# Patient Record
Sex: FEMALE | Race: WHITE | Employment: PART TIME | URBAN - METROPOLITAN AREA
[De-identification: names, ages, dates, MRNs, and addresses within clinical notes are randomized per-mention and may not be internally consistent; named-entity substitution may affect disease eponyms.]

---

## 2017-05-29 ENCOUNTER — HOSPITAL ENCOUNTER (EMERGENCY)
Age: 42
Discharge: HOME | End: 2017-05-29
Attending: EMERGENCY MEDICINE
Payer: SUBSIDIZED

## 2017-05-29 VITALS
SYSTOLIC BLOOD PRESSURE: 107 MMHG | TEMPERATURE: 97.6 F | BODY MASS INDEX: 27.14 KG/M2 | HEART RATE: 52 BPM | OXYGEN SATURATION: 100 % | WEIGHT: 159 LBS | DIASTOLIC BLOOD PRESSURE: 63 MMHG | RESPIRATION RATE: 18 BRPM | HEIGHT: 64 IN

## 2017-05-29 DIAGNOSIS — R09.81 SINUS CONGESTION: Primary | ICD-10-CM

## 2017-05-29 PROCEDURE — 99284 EMERGENCY DEPT VISIT MOD MDM: CPT | Performed by: PHYSICIAN ASSISTANT

## 2017-05-29 PROCEDURE — 74011250637 HC RX REV CODE- 250/637: Performed by: PHYSICIAN ASSISTANT

## 2017-05-29 RX ORDER — IBUPROFEN 800 MG/1
800 TABLET ORAL
Qty: 30 TAB | Refills: 0 | Status: SHIPPED | OUTPATIENT
Start: 2017-05-29 | End: 2017-06-08

## 2017-05-29 RX ORDER — DEXAMETHASONE SODIUM PHOSPHATE 100 MG/10ML
10 INJECTION INTRAMUSCULAR; INTRAVENOUS
Status: COMPLETED | OUTPATIENT
Start: 2017-05-29 | End: 2017-05-29

## 2017-05-29 RX ORDER — ACETAMINOPHEN 500 MG
1000 TABLET ORAL
Status: COMPLETED | OUTPATIENT
Start: 2017-05-29 | End: 2017-05-29

## 2017-05-29 RX ORDER — PSEUDOEPHEDRINE HCL 120 MG/1
120 TABLET, FILM COATED, EXTENDED RELEASE ORAL EVERY 12 HOURS
Qty: 20 TAB | Refills: 0 | Status: SHIPPED | OUTPATIENT
Start: 2017-05-29 | End: 2017-06-08

## 2017-05-29 RX ORDER — CETIRIZINE HCL 10 MG
10 TABLET ORAL DAILY
Qty: 20 TAB | Refills: 0 | Status: SHIPPED | OUTPATIENT
Start: 2017-05-29 | End: 2017-06-08

## 2017-05-29 RX ADMIN — DEXAMETHASONE SODIUM PHOSPHATE 10 MG: 10 INJECTION INTRAMUSCULAR; INTRAVENOUS at 09:31

## 2017-05-29 RX ADMIN — ACETAMINOPHEN 1000 MG: 500 TABLET ORAL at 09:31

## 2017-05-29 NOTE — LETTER
NOTIFICATION RETURN TO WORK / SCHOOL 
 
5/29/2017 9:44 AM 
 
Ms. Adriana Walker [address] [address] To Whom It May Concern: 
 
Adriana Walker is currently under the care of [facility] EMERGENCY DEPT. She will return to work/school on: 6-1-17 If there are questions or concerns please have the patient contact our office. Sincerely, Nick Vera RN

## 2017-05-29 NOTE — ED TRIAGE NOTES
Pt arrive c/o facial swelling to left side and sinus drainage. Pt states \"I see something white in my nose on the bone that has been there for 1-2 months. \"

## 2017-05-29 NOTE — ED PROVIDER NOTES
HPI Comments: Patient presents to the ER complaining of left-sided \"sinus congestion /drainage and pain\". Dull, aching pain radiates behind left eye, also endorses postnasal drip. Patient states symptoms started 2 days ago, congestion is worse at night. Patient states she noticed a \"white spots\" on the left nare. Endorsed one epistaxis yesterday. Resolved after less than 10 minutes. Denies dental pain, ear pain, fever, nausea, vomiting, neck pain, chest pain, cough her palpitations are decreased appetite. No sick contacts. Patient is a smoker. Currently taking ciprofloxacin for UTI. Denies cocaine use. Hysterectomy last month. Patient is a 42 y.o. female presenting with sinus problems. Sinus Infection    Associated symptoms include congestion, sinus pressure and rhinorrhea. Pertinent negatives include no chills, no ear pain, no cough, no neck pain and no neck pain. Past Medical History:   Diagnosis Date    Dental disorder     Fibromyalgia     Long-term current use of methadone for opiate dependence ([facility] [address]     Nausea & vomiting 10/31/2011    Psychiatric disorder     depression, ADHD and anxiety    Pyelonephritis, acute 10/31/2011    Pyelonephritis, acute 10/31/2011       Past Surgical History:   Procedure Laterality Date    HX CHOLECYSTECTOMY      HX GYN      uterine suspension    HX HEENT      tonsillectomy; TMJ    HX HEENT  2016    All top teeth removed due to gum disease    HX ORTHOPAEDIC      heel spurs ronen heels         Family History:   Problem Relation Age of Onset    Stroke Father      CEREBRAL HEMMORHAGE    Alcohol abuse Father     Arthritis-osteo Mother     Arthritis-rheumatoid Mother     Heart Disease Maternal Grandfather        Social History     Social History    Marital status: SINGLE     Spouse name: N/A    Number of children: N/A    Years of education: N/A     Occupational History    Not on file.      Social History Main Topics    Smoking status: Current Every Day Smoker     Packs/day: 0.50     Years: 20.00    Smokeless tobacco: Never Used    Alcohol use No    Drug use: No      Comment: previous opiate addict- clean x 5 years, methadone    Sexual activity: Not Currently     Other Topics Concern    Not on file     Social History Narrative         ALLERGIES: Penicillins    Review of Systems   Constitutional: Negative for chills, fatigue and fever. HENT: Positive for congestion, facial swelling, nosebleeds, postnasal drip, rhinorrhea and sinus pressure. Negative for dental problem, ear discharge, ear pain, tinnitus, trouble swallowing and voice change. Respiratory: Negative for cough. Gastrointestinal: Negative for nausea. Musculoskeletal: Negative for neck pain and neck stiffness. Skin: Negative for rash. Vitals:    05/29/17 0814   BP: 152/77   Pulse: 74   Resp: 18   Temp: 97.6 °F (36.4 °C)   SpO2: 99%   Weight: 72.1 kg (159 lb)   Height: 5' 4\" (1.626 m)            Physical Exam   Constitutional: She appears well-developed and well-nourished. Healthy, nontoxic appearing well-nourished white female in no acute distress. HENT:   Head: Normocephalic and atraumatic. Right Ear: Tympanic membrane and external ear normal.   Left Ear: Tympanic membrane and external ear normal.   Nose: Septal deviation present. No mucosal edema, rhinorrhea, nose lacerations, sinus tenderness or nasal septal hematoma. No epistaxis. Mouth/Throat: Oropharynx is clear and moist.   TMs bilaterally clear, posterior oropharynx with some injection, no edema, erythema or exudate. Uvula midline. Patient has false teeth upper jaw, partial lower jaw. No trismus. No cervical lymphadenopathy. No neck tenderness. ROM neck normal.   Neck: Normal range of motion and full passive range of motion without pain. Neck supple. No spinous process tenderness and no muscular tenderness present. No rigidity. No edema present.    No meningeal signs   Pulmonary/Chest: Effort normal and breath sounds normal. No accessory muscle usage. No respiratory distress. She has no decreased breath sounds. She has no wheezes. She has no rhonchi. Nursing note and vitals reviewed. MDM  Number of Diagnoses or Management Options  Sinus congestion: new and requires workup  Risk of Complications, Morbidity, and/or Mortality  Presenting problems: moderate  Diagnostic procedures: moderate  Management options: moderate    Patient Progress  Patient progress: improved    ED Course       Procedures      The patient was observed in the ED. Results Reviewed:      No results found for this or any previous visit (from the past 24 hour(s)). Patient's major complaint is pain on left side of faced with nasal drainage and postnasal drip. We'll give Decadron by mouth in the ER, Rx for pseudoephedrine, Zyrtec and Motrin given. Patient agrees to follow up with PCP, community resource information given. Patient declines work note. I discussed the results of all labs, procedures, radiographs, and treatments with the patient and available family. Treatment plan is agreed upon and the patient is ready for discharge. All voiced understanding of the discharge plan and medication instructions or changes as appropriate. Questions about treatment in the ED were answered. All were encouraged to return should symptoms worsen or new problems develop.                      `

## 2017-06-02 ENCOUNTER — HOSPITAL ENCOUNTER (EMERGENCY)
Age: 42
Discharge: HOME | End: 2017-06-02
Attending: EMERGENCY MEDICINE
Payer: SUBSIDIZED

## 2017-06-02 VITALS
DIASTOLIC BLOOD PRESSURE: 49 MMHG | BODY MASS INDEX: 27.14 KG/M2 | HEIGHT: 64 IN | RESPIRATION RATE: 16 BRPM | WEIGHT: 159 LBS | SYSTOLIC BLOOD PRESSURE: 109 MMHG | OXYGEN SATURATION: 98 % | HEART RATE: 96 BPM | TEMPERATURE: 97.7 F

## 2017-06-02 DIAGNOSIS — G56.02 CARPAL TUNNEL SYNDROME OF LEFT WRIST: Primary | ICD-10-CM

## 2017-06-02 LAB
AMPHET UR QL SCN: POSITIVE
BARBITURATES UR QL SCN: NEGATIVE
BENZODIAZ UR QL: POSITIVE
CANNABINOIDS UR QL SCN: POSITIVE
COCAINE UR QL SCN: NEGATIVE
HCG UR QL: NEGATIVE
METHADONE UR QL: NEGATIVE
OPIATES UR QL: POSITIVE
PCP UR QL: NEGATIVE

## 2017-06-02 PROCEDURE — 80307 DRUG TEST PRSMV CHEM ANLYZR: CPT | Performed by: EMERGENCY MEDICINE

## 2017-06-02 PROCEDURE — 81025 URINE PREGNANCY TEST: CPT

## 2017-06-02 PROCEDURE — 74011250637 HC RX REV CODE- 250/637: Performed by: NURSE PRACTITIONER

## 2017-06-02 PROCEDURE — 81003 URINALYSIS AUTO W/O SCOPE: CPT | Performed by: NURSE PRACTITIONER

## 2017-06-02 PROCEDURE — 75810000053 HC SPLINT APPLICATION: Performed by: NURSE PRACTITIONER

## 2017-06-02 PROCEDURE — 99284 EMERGENCY DEPT VISIT MOD MDM: CPT | Performed by: NURSE PRACTITIONER

## 2017-06-02 PROCEDURE — L3908 WHO COCK-UP NONMOLDE PRE OTS: HCPCS

## 2017-06-02 RX ORDER — PREDNISONE 20 MG/1
20 TABLET ORAL DAILY
Qty: 7 TAB | Refills: 0 | Status: SHIPPED | OUTPATIENT
Start: 2017-06-02 | End: 2017-06-09

## 2017-06-02 RX ORDER — PREDNISONE 50 MG/1
50 TABLET ORAL DAILY
Qty: 3 TAB | Refills: 0 | Status: SHIPPED | OUTPATIENT
Start: 2017-06-02 | End: 2017-06-05

## 2017-06-02 RX ORDER — HYDROCODONE BITARTRATE AND ACETAMINOPHEN 5; 325 MG/1; MG/1
1 TABLET ORAL ONCE
Status: COMPLETED | OUTPATIENT
Start: 2017-06-02 | End: 2017-06-02

## 2017-06-02 RX ADMIN — HYDROCODONE BITARTRATE AND ACETAMINOPHEN 1 TABLET: 5; 325 TABLET ORAL at 14:18

## 2017-06-02 NOTE — LETTER
[facility] [facility] EMERGENCY DEPT [address] [id number] [phone] Work/School Note Date: 6/2/2017 To Whom It May concern: 
 
Andre Veronica was seen and treated today in the emergency room by the following provider(s): 
Attending Provider: Heather Candelario MD 
Nurse Practitioner: Sabina Hughes NP. Andre Morenoing Special Instructions: May resume activity with splint on left wrist for one month. Sincerely, Sabina Hughes NP

## 2017-06-02 NOTE — ED TRIAGE NOTES
Pt doing yard work today and left arm started to hurt when shoveling, started about an hours and a half ago. Pt questioned triage as to why we needed a urine specimen, explained and stated she had a hysterectomy and didn't need to give a specimen, pt was told she still needed to give a urine specimen.

## 2017-06-02 NOTE — ED NOTES
Velcro splint applied to L wrist per NP instructions. Fingers warm with cap refill <3 sec. Radial pulse positive.

## 2017-06-02 NOTE — DISCHARGE INSTRUCTIONS
Carpal Tunnel Syndrome: Care Instructions  Your Care Instructions    Carpal tunnel syndrome is a nerve problem. It can cause tingling, numbness, weakness, or pain in the fingers, thumb, and hand. The median nerve and several tough tissues called tendons run through a space in the wrist called the carpal tunnel. The repeated hand motions used in work and some hobbies and sports can put pressure on the nerve. Pregnancy and several conditions, including diabetes, arthritis, and an underactive thyroid, also can cause carpal tunnel syndrome. You may be able to limit an activity or do it differently to reduce your symptoms. You also can take other steps to feel better. If your symptoms are mild, 1 to 2 weeks of home treatment are likely to ease your pain. Surgery is needed only if other treatments do not work. Follow-up care is a key part of your treatment and safety. Be sure to make and go to all appointments, and call your doctor if you are having problems. It's also a good idea to know your test results and keep a list of the medicines you take. How can you care for yourself at home? · If possible, stop or reduce the activity that causes your symptoms. If you cannot stop the activity, take frequent breaks to rest and stretch or change hand positions to do a task. Try switching hands, such as when using a computer mouse. · Try to avoid bending or twisting your wrists. · Ask your doctor if you can take an over-the-counter pain medicine, such as acetaminophen (Tylenol), ibuprofen (Advil, Motrin), or naproxen (Aleve). Be safe with medicines. Read and follow all instructions on the label. · If your doctor prescribes corticosteroid medicine to help reduce pain and swelling, take it exactly as prescribed. Call your doctor if you think you are having a problem with your medicine. · Put ice or a cold pack on your wrist for 10 to 20 minutes at a time to ease pain.  Put a thin cloth between the ice and your skin.  · If your doctor or your physical or occupational therapist tells you to wear a wrist splint, wear it as directed to keep your wrist in a neutral position. This also eases pressure on your median nerve. · Ask your doctor whether you should have physical or occupational therapy to learn how to do tasks differently. · Try a yoga class to stretch your muscles and build strength in your hands and wrists. Yoga has been shown to ease carpal tunnel symptoms. To prevent carpal tunnel  · When working at a computer, keep your hands and wrists in line with your forearms. Hold your elbows close to your sides. Take a break every 10 to 15 minutes. · Try these exercises:  ¨ Warm up: Rotate your wrist up, down, and from side to side. Repeat this 4 times. Stretch your fingers far apart, relax them, then stretch them again. Repeat 4 times. Stretch your thumb by pulling it back gently, holding it, and then releasing it. Repeat 4 times. ¨ Prayer stretch: Start with your palms together in front of your chest just below your chin. Slowly lower your hands toward your waistline while keeping your hands close to your stomach and your palms together until you feel a mild to moderate stretch under your forearms. Hold for 10 to 20 seconds. Repeat 4 times. ¨ Wrist flexor stretch: Hold your arm in front of you with your palm up. Bend your wrist, pointing your hand toward the floor. With your other hand, gently bend your wrist further until you feel a mild to moderate stretch in your forearm. Hold for 10 to 20 seconds. Repeat 4 times. ¨ Wrist extensor stretch: Repeat the steps for the wrist flexor stretch, but begin with your extended hand palm down. · Squeeze a rubber exercise ball several times a day to keep your hands and fingers strong. · Avoid holding objects (such as a book) in one position for a long time. When possible, use your whole hand to grasp an object.  Using just the thumb and index finger can put stress on the wrist.  · Do not smoke. It can make this condition worse by reducing blood flow to the median nerve. If you need help quitting, talk to your doctor about stop-smoking programs and medicines. These can increase your chances of quitting for good. When should you call for help? Watch closely for changes in your health, and be sure to contact your doctor if:  · Your pain or other problems do not get better with home care. · You want more information about physical or occupational therapy. · You have side effects of your corticosteroid medicine, such as:  ¨ Weight gain. ¨ Mood changes. ¨ Trouble sleeping. ¨ Bruising easily. · You have any other problems with your medicine. Where can you learn more? Go to http://denisa-galo.info/. Enter R432 in the search box to learn more about \"Carpal Tunnel Syndrome: Care Instructions. \"  Current as of: May 23, 2016  Content Version: 11.2  © 8870-4905 [employer] Care instructions adapted under license by [employer] (which disclaims liability or warranty for this information). If you have questions about a medical condition or this instruction, always ask your healthcare professional. [place] [address] any warranty or liability for your use of this information. Carpal Tunnel Syndrome: Exercises  Your Care Instructions  Here are some examples of typical rehabilitation exercises for your condition. Start each exercise slowly. Ease off the exercise if you start to have pain. Your doctor or your physical or occupational therapist will tell you when you can start these exercises and which ones will work best for you. How to do the exercises  Note: When you no longer have pain or numbness, you can do exercises to help prevent carpal tunnel syndrome from coming back. Do not do any stretch or movement that is uncomfortable or painful. Warm-up stretches  1. Rotate your wrist up, down, and from side to side. Repeat 4 times. 2. Stretch your fingers far apart. Relax them, and then stretch them again. Repeat 4 times. 3. Stretch your thumb by pulling it back gently, holding it, and then releasing it. Repeat 4 times. Prayer stretch    1. Start with your palms together in front of your chest just below your chin. 2. Slowly lower your hands toward your waistline, keeping your hands close to your stomach and your palms together until you feel a mild to moderate stretch under your forearms. 3. Hold for at least 15 to 30 seconds. Repeat 2 to 4 times. Wrist flexor stretch    1. Extend your arm in front of you with your palm up. 2. Bend your wrist, pointing your hand toward the floor. 3. With your other hand, gently bend your wrist farther until you feel a mild to moderate stretch in your forearm. 4. Hold for at least 15 to 30 seconds. Repeat 2 to 4 times. Wrist extensor stretch    Repeat steps 1 through 4 of the stretch above, but begin with your extended hand palm down. Follow-up care is a key part of your treatment and safety. Be sure to make and go to all appointments, and call your doctor if you are having problems. It's also a good idea to know your test results and keep a list of the medicines you take. Where can you learn more? Go to http://denisa-galo.info/. Enter A332 in the search box to learn more about \"Carpal Tunnel Syndrome: Exercises. \"  Current as of: May 23, 2016  Content Version: 11.2  © 2513-7022 [employer] Care instructions adapted under license by [employer] (which disclaims liability or warranty for this information). If you have questions about a medical condition or this instruction, always ask your healthcare professional. [place] [address] any warranty or liability for your use of this information.

## 2017-06-02 NOTE — ED PROVIDER NOTES
HPI Comments: 41 y/o f to ed with left forearm pain into her thumb and index and middle finger onset yesterday. Worse today with doing yard work. No other complaints. No numbness or tingling. Pain with palpation and mvt    Patient is a 42 y.o. female presenting with arm pain. The history is provided by the patient. No  was used. Arm Pain    This is a new problem. The current episode started yesterday. The problem has been gradually worsening. The pain is present in the left arm. The pain is moderate. Associated symptoms include stiffness. Pertinent negatives include no numbness, full range of motion, no tingling, no itching, no back pain and no neck pain. There has been a history of trauma (repetative motion). Past Medical History:   Diagnosis Date    Dental disorder     Fibromyalgia     Long-term current use of methadone for opiate dependence ([facility] [address]     Nausea & vomiting 10/31/2011    Psychiatric disorder     depression, ADHD and anxiety    Pyelonephritis, acute 10/31/2011    Pyelonephritis, acute 10/31/2011       Past Surgical History:   Procedure Laterality Date    HX CHOLECYSTECTOMY      HX GYN      uterine suspension    HX HEENT      tonsillectomy; TMJ    HX HEENT  2016    All top teeth removed due to gum disease    HX ORTHOPAEDIC      heel spurs ronen heels         Family History:   Problem Relation Age of Onset    Stroke Father      CEREBRAL HEMMORHAGE    Alcohol abuse Father     Arthritis-osteo Mother     Arthritis-rheumatoid Mother     Heart Disease Maternal Grandfather        Social History     Social History    Marital status: SINGLE     Spouse name: N/A    Number of children: N/A    Years of education: N/A     Occupational History    Not on file.      Social History Main Topics    Smoking status: Current Every Day Smoker     Packs/day: 0.50     Years: 20.00    Smokeless tobacco: Never Used    Alcohol use No    Drug use: No      Comment: previous opiate addict- clean x 5 years, methadone    Sexual activity: Not Currently     Other Topics Concern    Not on file     Social History Narrative         ALLERGIES: Penicillins    Review of Systems   Constitutional: Negative for chills and fever. HENT: Negative for facial swelling and mouth sores. Eyes: Negative for discharge and redness. Respiratory: Negative for cough and shortness of breath. Cardiovascular: Negative for chest pain and palpitations. Gastrointestinal: Negative for abdominal pain, nausea and vomiting. Endocrine: Negative for cold intolerance and heat intolerance. Genitourinary: Negative for difficulty urinating, dysuria and menstrual problem. Musculoskeletal: Positive for myalgias and stiffness. Negative for back pain and neck pain. Skin: Positive for color change. Negative for itching, pallor, rash and wound. Neurological: Negative for dizziness, tingling, weakness and numbness. Psychiatric/Behavioral: Negative for confusion and decreased concentration. Vitals:    06/02/17 1304   BP: 109/49   Pulse: 96   Resp: 16   Temp: 97.7 °F (36.5 °C)   SpO2: 98%   Weight: 72.1 kg (159 lb)   Height: 5' 4\" (1.626 m)            Physical Exam   Constitutional: She is oriented to person, place, and time. She appears well-developed and well-nourished. No distress. HENT:   Head: Normocephalic and atraumatic. Right Ear: External ear normal.   Left Ear: External ear normal.   Nose: Nose normal.   Eyes: Conjunctivae and EOM are normal. Pupils are equal, round, and reactive to light. Neck: Normal range of motion. Neck supple. Cardiovascular: Normal rate, regular rhythm and normal heart sounds. Pulmonary/Chest: Effort normal and breath sounds normal. No respiratory distress. She has no wheezes. Abdominal: Soft. Bowel sounds are normal. She exhibits no distension. There is no tenderness. Musculoskeletal: Normal range of motion. She exhibits tenderness. She exhibits no edema. Arms:  Neurological: She is alert and oriented to person, place, and time. No cranial nerve deficit. Coordination normal.   Skin: Skin is warm and dry. No rash noted. Psychiatric: She has a normal mood and affect. Her behavior is normal. Judgment and thought content normal.   Nursing note and vitals reviewed. MDM  Number of Diagnoses or Management Options  Diagnosis management comments: 41 y/o f w left carpal tunnel syndrome,  Of note, uds pos for benzos, amphetamines, thc and opiates. She has asked for rsx for pain meds. Will supply only steroids.        Amount and/or Complexity of Data Reviewed  Clinical lab tests: ordered and reviewed    Risk of Complications, Morbidity, and/or Mortality  Presenting problems: minimal  Diagnostic procedures: minimal  Management options: minimal    Patient Progress  Patient progress: stable    ED Course       Procedures

## 2017-06-08 PROBLEM — G47.00 INSOMNIA: Status: ACTIVE | Noted: 2017-06-08

## 2017-06-08 PROBLEM — F41.9 ANXIETY: Status: ACTIVE | Noted: 2017-06-08

## 2017-06-21 PROBLEM — G56.02 CARPAL TUNNEL SYNDROME OF LEFT WRIST: Status: ACTIVE | Noted: 2017-06-21

## 2017-06-21 PROBLEM — R41.840 CONCENTRATION DEFICIT: Status: ACTIVE | Noted: 2017-06-21

## 2017-11-23 ENCOUNTER — HOSPITAL ENCOUNTER (EMERGENCY)
Age: 42
Discharge: HOME | End: 2017-11-23
Attending: EMERGENCY MEDICINE
Payer: SUBSIDIZED

## 2017-11-23 VITALS
RESPIRATION RATE: 17 BRPM | HEART RATE: 81 BPM | BODY MASS INDEX: 27.64 KG/M2 | SYSTOLIC BLOOD PRESSURE: 131 MMHG | DIASTOLIC BLOOD PRESSURE: 60 MMHG | TEMPERATURE: 97.8 F | HEIGHT: 63 IN | OXYGEN SATURATION: 99 % | WEIGHT: 156 LBS

## 2017-11-23 DIAGNOSIS — M54.5 MIDLINE LOW BACK PAIN, UNSPECIFIED CHRONICITY, WITH SCIATICA PRESENCE UNSPECIFIED: ICD-10-CM

## 2017-11-23 DIAGNOSIS — G56.02 CARPAL TUNNEL SYNDROME OF LEFT WRIST: ICD-10-CM

## 2017-11-23 DIAGNOSIS — M25.562 ARTHRALGIA OF LEFT LOWER LEG: ICD-10-CM

## 2017-11-23 DIAGNOSIS — M54.2 NECK PAIN: Primary | ICD-10-CM

## 2017-11-23 DIAGNOSIS — M79.602 PAIN OF LEFT UPPER EXTREMITY: ICD-10-CM

## 2017-11-23 PROCEDURE — 96372 THER/PROPH/DIAG INJ SC/IM: CPT | Performed by: EMERGENCY MEDICINE

## 2017-11-23 PROCEDURE — 74011250637 HC RX REV CODE- 250/637: Performed by: EMERGENCY MEDICINE

## 2017-11-23 PROCEDURE — 99283 EMERGENCY DEPT VISIT LOW MDM: CPT | Performed by: EMERGENCY MEDICINE

## 2017-11-23 PROCEDURE — 74011250636 HC RX REV CODE- 250/636: Performed by: EMERGENCY MEDICINE

## 2017-11-23 RX ORDER — CYCLOBENZAPRINE HCL 5 MG
10 TABLET ORAL
Qty: 30 TAB | Refills: 0 | Status: SHIPPED | OUTPATIENT
Start: 2017-11-23 | End: 2018-02-02

## 2017-11-23 RX ORDER — DEXAMETHASONE SODIUM PHOSPHATE 100 MG/10ML
10 INJECTION INTRAMUSCULAR; INTRAVENOUS ONCE
Status: COMPLETED | OUTPATIENT
Start: 2017-11-23 | End: 2017-11-23

## 2017-11-23 RX ORDER — CYCLOBENZAPRINE HCL 10 MG
10 TABLET ORAL
Qty: 30 TAB | Refills: 0 | Status: SHIPPED | OUTPATIENT
Start: 2017-11-23 | End: 2018-02-02

## 2017-11-23 RX ORDER — HYDROMORPHONE HCL IN 0.9% NACL 50 MG/50ML
1 PLASTIC BAG, INJECTION (ML) INJECTION
Status: COMPLETED | OUTPATIENT
Start: 2017-11-23 | End: 2017-11-23

## 2017-11-23 RX ORDER — HYDROCODONE BITARTRATE AND ACETAMINOPHEN 5; 325 MG/1; MG/1
1-2 TABLET ORAL
Qty: 8 TAB | Refills: 0 | Status: SHIPPED | OUTPATIENT
Start: 2017-11-23 | End: 2018-02-02 | Stop reason: SDUPTHER

## 2017-11-23 RX ORDER — PROMETHAZINE HYDROCHLORIDE 25 MG/ML
25 INJECTION, SOLUTION INTRAMUSCULAR; INTRAVENOUS
Status: COMPLETED | OUTPATIENT
Start: 2017-11-23 | End: 2017-11-23

## 2017-11-23 RX ORDER — NAPROXEN 500 MG/1
500 TABLET ORAL 2 TIMES DAILY WITH MEALS
Qty: 60 TAB | Refills: 0 | Status: SHIPPED | OUTPATIENT
Start: 2017-11-23

## 2017-11-23 RX ORDER — NAPROXEN SODIUM 550 MG/1
550 TABLET ORAL 2 TIMES DAILY WITH MEALS
Qty: 20 TAB | Refills: 0 | Status: SHIPPED | OUTPATIENT
Start: 2017-11-23 | End: 2017-11-23

## 2017-11-23 RX ORDER — IBUPROFEN 800 MG/1
800 TABLET ORAL
Status: COMPLETED | OUTPATIENT
Start: 2017-11-23 | End: 2017-11-23

## 2017-11-23 RX ORDER — PREDNISONE 20 MG/1
60 TABLET ORAL DAILY
Qty: 15 TAB | Refills: 0 | Status: SHIPPED | OUTPATIENT
Start: 2017-11-23 | End: 2017-11-29

## 2017-11-23 RX ORDER — PREDNISONE 20 MG/1
40 TABLET ORAL DAILY
Qty: 15 TAB | Refills: 0 | Status: SHIPPED | OUTPATIENT
Start: 2017-11-23 | End: 2017-11-23

## 2017-11-23 RX ADMIN — IBUPROFEN 800 MG: 800 TABLET ORAL at 09:26

## 2017-11-23 RX ADMIN — DEXAMETHASONE SODIUM PHOSPHATE 10 MG: 10 INJECTION INTRAMUSCULAR; INTRAVENOUS at 09:08

## 2017-11-23 RX ADMIN — PROMETHAZINE HYDROCHLORIDE 25 MG: 25 INJECTION INTRAMUSCULAR; INTRAVENOUS at 09:26

## 2017-11-23 RX ADMIN — Medication 1 MG: at 09:27

## 2017-11-23 NOTE — ED NOTES
I have reviewed discharge instructions with the patient. The patient verbalized understanding. Patient left ED via Discharge Method: ambulatory to Home with self. Opportunity for questions and clarification provided. Patient given 4 scripts. To continue your aftercare when you leave the hospital, you may receive an automated call from our care team to check in on how you are doing. This is a free service and part of our promise to provide the best care and service to meet your aftercare needs.  If you have questions, or wish to unsubscribe from this service please call [phone]. Thank you for Choosing our [facility] Emergency Department.

## 2017-11-23 NOTE — DISCHARGE INSTRUCTIONS
Use cold packs 5 to 10 minutes, every hour to every-other-hour, for first 48 hours,  Then switch to a heating pad, 5 to 10 minutes, every hour to every-other-hour, for a few days,  Do not go to heat, right away    you should rest today, flat on your back for 45 minutes, and then stand up straight for 15 minutes  Then flat on her back again for 45 minutes, then stand up straight for 15 minutes. If you stay flat on your back all day that will increase the swelling  Continue to repeat this throughout today    Avoid The seated position as much as possible           Back Pain: Care Instructions  Your Care Instructions    Back pain has many possible causes. It is often related to problems with muscles and ligaments of the back. It may also be related to problems with the nerves, discs, or bones of the back. Moving, lifting, standing, sitting, or sleeping in an awkward way can strain the back. Sometimes you don't notice the injury until later. Arthritis is another common cause of back pain. Although it may hurt a lot, back pain usually improves on its own within several weeks. Most people recover in 12 weeks or less. Using good home treatment and being careful not to stress your back can help you feel better sooner. Follow-up care is a key part of your treatment and safety. Be sure to make and go to all appointments, and call your doctor if you are having problems. It's also a good idea to know your test results and keep a list of the medicines you take. How can you care for yourself at home? · Sit or lie in positions that are most comfortable and reduce your pain. Try one of these positions when you lie down:  ¨ Lie on your back with your knees bent and supported by large pillows. ¨ Lie on the floor with your legs on the seat of a sofa or chair. [address]  on your side with your knees and hips bent and a pillow between your legs. ¨ Lie on your stomach if it does not make pain worse.   · Do not sit up in bed, and avoid soft couches and twisted positions. Bed rest can help relieve pain at first, but it delays healing. Avoid bed rest after the first day of back pain. · Change positions every 30 minutes. If you must sit for long periods of time, take breaks from sitting. Get up and walk around, or lie in a comfortable position. · Try using a heating pad on a low or medium setting for 15 to 20 minutes every 2 or 3 hours. Try a warm shower in place of one session with the heating pad. · You can also try an ice pack for 10 to 15 minutes every 2 to 3 hours. Put a thin cloth between the ice pack and your skin. · Take pain medicines exactly as directed. ¨ If the doctor gave you a prescription medicine for pain, take it as prescribed. ¨ If you are not taking a prescription pain medicine, ask your doctor if you can take an over-the-counter medicine. · Take short walks several times a day. You can start with 5 to 10 minutes, 3 or 4 times a day, and work up to longer walks. Walk on level surfaces and avoid hills and stairs until your back is better. · Return to work and other activities as soon as you can. Continued rest without activity is usually not good for your back. · To prevent future back pain, do exercises to stretch and strengthen your back and stomach. Learn how to use good posture, safe lifting techniques, and proper body mechanics. When should you call for help? Call your doctor now or seek immediate medical care if:  ? · You have new or worsening numbness in your legs. ? · You have new or worsening weakness in your legs. (This could make it hard to stand up.)   ? · You lose control of your bladder or bowels. ? Watch closely for changes in your health, and be sure to contact your doctor if:  ? · Your pain gets worse. ? · You are not getting better after 2 weeks. Where can you learn more? Go to http://denisa-galo.info/.   Enter N394 in the search box to learn more about \"Back Pain: Care Instructions. \"  Current as of: March 21, 2017  Content Version: 11.4  © 2844-9614 Healthwise, [facility]. Care instructions adapted under license by [employer] (which disclaims liability or warranty for this information). If you have questions about a medical condition or this instruction, always ask your healthcare professional. [place] [address] any warranty or liability for your use of this information.

## 2017-11-23 NOTE — ED TRIAGE NOTES
Reports has multiple back problems. States fell last night. States her left side \"goes numb\" and she is in the \"most chronic pain that you can imagine. \"

## 2017-11-23 NOTE — ED PROVIDER NOTES
HPI Comments: Patient sustained a third fall with sprain of a few months  Last night.,  She lost her balance when getting up to go to the bathroom and fell, landing on her left side. Patient complains of pain from her left heel all the way up to the left side of her neck, including all of the leg and ARM. Patient has had ongoing pain in these areas which is only slightly worse now after the fall that before. Patient has had MRIs of the neck and back and is due to see a surgeon at [facility] on November 28. She was even admitted at one point and had MRIs to make sure there was not a stroke. Pain was already worsening prior to last night's fall. She actually had to leave work early  yesterday    Patient is a 42 y.o. female presenting with back pain. The history is provided by the patient. Back Pain    This is a chronic problem. Past Medical History:   Diagnosis Date    Carpal tunnel syndrome of left wrist 6/21/2017    Dental disorder     Fibromyalgia     Insomnia 6/8/2017    Long-term current use of methadone for opiate dependence (HCC)     Nausea & vomiting 10/31/2011    Psychiatric disorder     depression, ADHD and anxiety    Pyelonephritis, acute 10/31/2011    Pyelonephritis, acute 10/31/2011       Past Surgical History:   Procedure Laterality Date    HX CHOLECYSTECTOMY      HX GYN      uterine suspension    HX GYN  04/2017    total HYSTER    HX HEENT      tonsillectomy;  TMJ    HX HEENT  2016    All top teeth removed due to gum disease    HX ORTHOPAEDIC      heel spurs ronen heels         Family History:   Problem Relation Age of Onset    Stroke Father      CEREBRAL HEMMORHAGE    Alcohol abuse Father     Arthritis-osteo Mother     Arthritis-rheumatoid Mother     Hypertension Mother     Hypertension Maternal Grandfather     Heart Disease Maternal Grandfather     Arthritis-osteo Maternal Grandmother     Hypertension Maternal Grandmother     Other Maternal Grandmother raynauds       Social History     Social History    Marital status: SINGLE     Spouse name: N/A    Number of children: N/A    Years of education: N/A     Occupational History    Not on file. Social History Main Topics    Smoking status: Current Every Day Smoker     Packs/day: 0.50     Years: 20.00    Smokeless tobacco: Never Used    Alcohol use No    Drug use: No      Comment: h/o opiate abuse    Sexual activity: Yes     Other Topics Concern    Not on file     Social History Narrative         ALLERGIES: Azithromycin; Clindamycin; Erythromycin; Keflex [cephalexin]; Macrobid [nitrofurantoin monohyd/m-cryst]; Penicillins; Doxycycline; Other medication; Oxybutynin; Oxycodone; Tape [adhesive]; and Tramadol    Review of Systems   Musculoskeletal: Positive for arthralgias, back pain, myalgias and neck pain. Skin: Negative for wound. All other systems reviewed and are negative. Vitals:    11/23/17 0836   BP: 134/60   Pulse: 84   Resp: 16   Temp: 97.7 °F (36.5 °C)   SpO2: 99%   Weight: 70.8 kg (156 lb)   Height: 5' 3\" (1.6 m)            Physical Exam   Constitutional: She is oriented to person, place, and time. She appears well-developed and well-nourished. No distress. HENT:   Head: Normocephalic and atraumatic. Eyes: Conjunctivae and EOM are normal. Right eye exhibits no discharge. Left eye exhibits no discharge. Neck: Normal range of motion. Neck supple. Spinous process tenderness and muscular tenderness present. Normal range of motion present. Pulmonary/Chest: Effort normal. No respiratory distress. Musculoskeletal: Normal range of motion. Left elbow: Tenderness found. Left knee: Tenderness found. Arms:       Legs:  Neurological: She is alert and oriented to person, place, and time. She has normal strength. She exhibits normal muscle tone. cni 2-12 grossly  Nl gait,  Nl speech     Skin: Skin is warm and dry. No rash noted. She is not diaphoretic.    Psychiatric: She has a normal mood and affect. Her behavior is normal.   Nursing note and vitals reviewed. MDM  Number of Diagnoses or Management Options  Diagnosis management comments: Medical decision making note:  acute on chronic neck and back pain  Patient feels, as do I, that no x-rays are currently indicated. We'll provide some muscle relaxers steroids, NSAIDs, and a one day course of  hydrocodone  This concludes the \"medical decision making note\" part of this emergency department visit note.       ED Course       Procedures

## 2018-02-02 ENCOUNTER — HOSPITAL ENCOUNTER (EMERGENCY)
Age: 43
Discharge: HOME | End: 2018-02-02
Attending: EMERGENCY MEDICINE
Payer: SUBSIDIZED

## 2018-02-02 VITALS
RESPIRATION RATE: 16 BRPM | DIASTOLIC BLOOD PRESSURE: 67 MMHG | HEART RATE: 65 BPM | WEIGHT: 154 LBS | OXYGEN SATURATION: 100 % | HEIGHT: 64 IN | TEMPERATURE: 98.2 F | BODY MASS INDEX: 26.29 KG/M2 | SYSTOLIC BLOOD PRESSURE: 126 MMHG

## 2018-02-02 DIAGNOSIS — G89.18 POST-OP PAIN: Primary | ICD-10-CM

## 2018-02-02 PROCEDURE — 96372 THER/PROPH/DIAG INJ SC/IM: CPT | Performed by: NURSE PRACTITIONER

## 2018-02-02 PROCEDURE — 74011250636 HC RX REV CODE- 250/636: Performed by: NURSE PRACTITIONER

## 2018-02-02 PROCEDURE — 99283 EMERGENCY DEPT VISIT LOW MDM: CPT | Performed by: NURSE PRACTITIONER

## 2018-02-02 RX ORDER — PROMETHAZINE HYDROCHLORIDE 25 MG/1
25 TABLET ORAL
Qty: 6 TAB | Refills: 0 | Status: SHIPPED | OUTPATIENT
Start: 2018-02-02

## 2018-02-02 RX ORDER — CYCLOBENZAPRINE HCL 5 MG
10 TABLET ORAL
Qty: 30 TAB | Refills: 0 | Status: SHIPPED | OUTPATIENT
Start: 2018-02-02 | End: 2018-04-09

## 2018-02-02 RX ADMIN — MEPERIDINE HYDROCHLORIDE 25 MG: 25 INJECTION INTRAMUSCULAR; INTRAVENOUS; SUBCUTANEOUS at 16:51

## 2018-02-02 NOTE — ED TRIAGE NOTES
Pt presents to ED c/o neck and upper back pain. Pt states she had surgery on neck and back in December. Pt states pain worsened last night after moving head. Pt reports intermittent tingling in left arm, denies at this time. Pt ambulatory without difficulty.

## 2018-02-02 NOTE — ED PROVIDER NOTES
HPI Comments: 44-year-old female presents to the emergency Department crying and tearful complaining of pain in her neck. She is status post cervical laminectomy on December 6 through the [facility] system. She reports that she is out of pain meds and that her neck hurts. She is unable to get in touch with her physician and she needs something for pain she has nothing at home. She is allergic to morphine and the only thing she can take is Dilaudid. She denies numbness, fever, chills, she admits nausea due to pain however there is no vomiting or diarrhea. She is eating and drinking well. She has excellent range of motion of her upper extremities bilaterally. Patient is a 36 y.o. female presenting with neck pain. The history is provided by the patient. No  was used. Neck Pain    This is a recurrent problem. The current episode started more than 2 days ago. The problem has been gradually worsening. Associated with: neck surgery. There has been no fever. The pain is present in the left side. The pain is moderate. The symptoms are aggravated by bending, twisting and certain positions. Pertinent negatives include no photophobia, no visual change, no chest pain, no syncope, no numbness, no weight loss, no headaches, no bowel incontinence, no bladder incontinence, no leg pain, no paresis, no tingling and no weakness.         Past Medical History:   Diagnosis Date    Carpal tunnel syndrome of left wrist 6/21/2017    Dental disorder     Fibromyalgia     Insomnia 6/8/2017    Long-term current use of methadone for opiate dependence (HCC)     Nausea & vomiting 10/31/2011    Psychiatric disorder     depression, ADHD and anxiety    Pyelonephritis, acute 10/31/2011    Pyelonephritis, acute 10/31/2011       Past Surgical History:   Procedure Laterality Date    HX CHOLECYSTECTOMY      HX GYN      uterine suspension    HX GYN  04/2017    total HYSTER    HX HEENT      tonsillectomy; TMJ    HX HEENT  2016    All top teeth removed due to gum disease    HX ORTHOPAEDIC      heel spurs ronen heels         Family History:   Problem Relation Age of Onset    Stroke Father      CEREBRAL HEMMORHAGE    Alcohol abuse Father     Arthritis-osteo Mother    Basilio Casanova Arthritis-rheumatoid Mother     Hypertension Mother     Hypertension Maternal Grandfather     Heart Disease Maternal Grandfather     Arthritis-osteo Maternal Grandmother     Hypertension Maternal Grandmother     Other Maternal Grandmother      raynauds       Social History     Social History    Marital status: SINGLE     Spouse name: N/A    Number of children: N/A    Years of education: N/A     Occupational History    Not on file. Social History Main Topics    Smoking status: Current Every Day Smoker     Packs/day: 0.50     Years: 20.00    Smokeless tobacco: Never Used    Alcohol use No    Drug use: No      Comment: h/o opiate abuse    Sexual activity: Yes     Other Topics Concern    Not on file     Social History Narrative         ALLERGIES: Azithromycin; Clindamycin; Erythromycin; Keflex [cephalexin]; Macrobid [nitrofurantoin monohyd/m-cryst]; Penicillins; Doxycycline; Morphine; Other medication; Oxybutynin; Oxycodone; Tape [adhesive]; and Tramadol    Review of Systems   Constitutional: Negative for appetite change, chills, fever and weight loss. HENT: Negative for facial swelling and mouth sores. Eyes: Negative for photophobia and discharge. Respiratory: Negative for cough and shortness of breath. Cardiovascular: Negative for chest pain, palpitations and syncope. Gastrointestinal: Positive for nausea. Negative for bowel incontinence, diarrhea and vomiting. Endocrine: Negative for cold intolerance and heat intolerance. Genitourinary: Negative for bladder incontinence, dysuria and frequency. Musculoskeletal: Positive for neck pain and neck stiffness. Negative for back pain.    Skin: Negative for color change, pallor and rash. Neurological: Negative for tingling, tremors, weakness, numbness and headaches. Psychiatric/Behavioral: Positive for dysphoric mood. Negative for confusion and decreased concentration. Vitals:    02/02/18 1418 02/02/18 1508   BP: (!) 126/91 157/81   Pulse: 78 83   Resp: 18    Temp: 98.2 °F (36.8 °C)    SpO2: 100% 100%   Weight: 69.9 kg (154 lb)    Height: 5' 4\" (1.626 m)             Physical Exam   Constitutional: She is oriented to person, place, and time. She appears well-developed and well-nourished. No distress. HENT:   Head: Normocephalic and atraumatic. Right Ear: External ear normal.   Left Ear: External ear normal.   Nose: Nose normal.   Eyes: Conjunctivae and EOM are normal. Pupils are equal, round, and reactive to light. Neck: Trachea normal and normal range of motion. Neck supple. Muscular tenderness present. No spinous process tenderness present. No rigidity. Edema present. No erythema present. Midline incision to posterior neck is well approximated. There is no sign of infection. She does have some slight edema to the left posterior neck however nothing unexpected after surgery   Cardiovascular: Normal rate, regular rhythm and normal heart sounds. Pulmonary/Chest: Effort normal and breath sounds normal. No respiratory distress. She has no wheezes. Abdominal: Soft. Bowel sounds are normal. She exhibits no distension. There is no tenderness. Musculoskeletal: Normal range of motion. She exhibits no edema or tenderness. Neurological: She is alert and oriented to person, place, and time. No cranial nerve deficit. Coordination normal.   Skin: Skin is warm and dry. No rash noted. Psychiatric: She has a normal mood and affect. Her behavior is normal. Judgment and thought content normal.   Nursing note and vitals reviewed.        MDM  Number of Diagnoses or Management Options  Diagnosis management comments: 41-year-old female presents to the emergency Department crying and tearful complaining of pain in her neck. She is status post cervical laminectomy on December 6 through the [facility] system. She reports that she is out of pain meds and that her neck hurts. She is unable to get in touch with her physician and she needs something for pain she has nothing at home. She is allergic to morphine and the only thing she can take is Dilaudid. She denies numbness, fever, chills, she admits nausea due to pain however there is no vomiting or diarrhea. She is eating and drinking well. She has excellent range of motion of her upper extremities bilaterally. Review of records indicates that her surgeon did not refill her medication as she had positive methamphetamines in her urine dip on January 19. We will not be providing Dilaudid to her here. I offered small dose of Demerol injectable here and she agreed.   We will dc with muscle relaxer and she can follow with her family physician or her surgeon for narcotics    Risk of Complications, Morbidity, and/or Mortality  Presenting problems: minimal  Diagnostic procedures: minimal  Management options: low    Patient Progress  Patient progress: stable        ED Course       Procedures

## 2018-02-02 NOTE — DISCHARGE INSTRUCTIONS
Acute Pain After Surgery: Care Instructions  Your Care Instructions    It's common to have some pain after surgery. Pain doesn't mean that something is wrong or that the surgery didn't go well. But when the pain is severe, it's important to work with your doctor to manage it. It's also important to be aware of a few facts about pain and pain medicine. · You are the only person who knows what your pain feels like. So be sure to tell your doctor when you are in pain or when the pain changes. Then he or she will know how to adjust your medicines. · Pain is often easier to control right after it starts. So it may be better to take regular doses of pain medicine and not wait until the pain gets bad. · Medicine can help control pain. But this doesn't mean you'll have no pain. Medicine works to keep the pain at a level you can live with. With time, you will feel better. Follow-up care is a key part of your treatment and safety. Be sure to make and go to all appointments, and call your doctor if you are having problems. It's also a good idea to know your test results and keep a list of the medicines you take. How can you care for yourself at home? · Be safe with medicines. Read and follow all instructions on the label. ¨ If the doctor gave you a prescription medicine for pain, take it as prescribed. ¨ If you are not taking a prescription pain medicine, ask your doctor if you can take an over-the-counter medicine. · If you take an over-the-counter pain medicine, such as acetaminophen (Tylenol), ibuprofen (Advil, Motrin), or naproxen (Aleve), read and follow all instructions on the label. · Do not take two or more pain medicines at the same time unless the doctor told you to. · Do not drink alcohol while you are taking pain medicines. · Try to walk each day if your doctor recommends it. Start by walking a little more than you did the day before. Bit by bit, increase the amount you walk.  Walking increases blood flow. It also helps prevent pneumonia and constipation. · To prevent constipation from opioid pain medicines:  ¨ Talk to your doctor about a laxative. ¨ Include fruits, vegetables, beans, and whole grains in your diet each day. These foods are high in fiber. ¨ Drink plenty of fluids, enough so that your urine is light yellow or clear like water. Drink water, fruit juice, or other drinks that do not contain caffeine or alcohol. If you have kidney, heart, or liver disease and have to limit fluids, talk with your doctor before you increase the amount of fluids you drink. ¨ Take a fiber supplement, such as Citrucel or Metamucil, every day if needed. Read and follow all instructions on the label. If you take pain medicine for more than a few days, talk to your doctor before you take fiber. When should you call for help? Call your doctor now or seek immediate medical care if:  ? · Your pain gets worse. ? · Your pain is not controlled by medicine. ? Watch closely for changes in your health, and be sure to contact your doctor if you have any problems. Where can you learn more? Go to http://denisa-galo.info/. Enter [phone] 676-524 in the search box to learn more about \"Acute Pain After Surgery: Care Instructions. \"  Current as of: March 20, 2017  Content Version: 11.4  © 2675-3670 [employer]. Care instructions adapted under license by [employer] (which disclaims liability or warranty for this information). If you have questions about a medical condition or this instruction, always ask your healthcare professional. [place] [address] any warranty or liability for your use of this information.

## 2018-02-02 NOTE — ED NOTES
I have reviewed discharge instructions with the patient. The patient verbalized understanding. Patient left ED via Discharge Method: ambulatory to Home with friend. Opportunity for questions and clarification provided. Patient given 2 scripts. To continue your aftercare when you leave the hospital, you may receive an automated call from our care team to check in on how you are doing. This is a free service and part of our promise to provide the best care and service to meet your aftercare needs.  If you have questions, or wish to unsubscribe from this service please call [phone]. Thank you for Choosing our [facility] Emergency Department.

## 2018-04-09 ENCOUNTER — HOSPITAL ENCOUNTER (EMERGENCY)
Age: 43
Discharge: HOME | End: 2018-04-09
Attending: EMERGENCY MEDICINE
Payer: SUBSIDIZED

## 2018-04-09 VITALS
SYSTOLIC BLOOD PRESSURE: 115 MMHG | TEMPERATURE: 98.1 F | BODY MASS INDEX: 26.29 KG/M2 | HEIGHT: 64 IN | RESPIRATION RATE: 16 BRPM | WEIGHT: 154 LBS | DIASTOLIC BLOOD PRESSURE: 81 MMHG | OXYGEN SATURATION: 100 % | HEART RATE: 75 BPM

## 2018-04-09 DIAGNOSIS — M54.6 CHRONIC MIDLINE THORACIC BACK PAIN: Primary | ICD-10-CM

## 2018-04-09 DIAGNOSIS — G89.29 CHRONIC MIDLINE THORACIC BACK PAIN: Primary | ICD-10-CM

## 2018-04-09 PROCEDURE — 99282 EMERGENCY DEPT VISIT SF MDM: CPT | Performed by: NURSE PRACTITIONER

## 2018-04-09 RX ORDER — CYCLOBENZAPRINE HCL 10 MG
10 TABLET ORAL
Qty: 30 TAB | Refills: 0 | Status: SHIPPED | OUTPATIENT
Start: 2018-04-09

## 2018-04-09 RX ORDER — DICLOFENAC SODIUM 50 MG/1
50 TABLET, DELAYED RELEASE ORAL 2 TIMES DAILY
Qty: 10 TAB | Refills: 0 | Status: SHIPPED | OUTPATIENT
Start: 2018-04-09

## 2018-04-09 RX ORDER — CYCLOBENZAPRINE HCL 10 MG
10 TABLET ORAL
Status: DISCONTINUED | OUTPATIENT
Start: 2018-04-09 | End: 2018-04-09 | Stop reason: HOSPADM

## 2018-04-09 RX ORDER — KETOROLAC TROMETHAMINE 30 MG/ML
30 INJECTION, SOLUTION INTRAMUSCULAR; INTRAVENOUS
Status: DISCONTINUED | OUTPATIENT
Start: 2018-04-09 | End: 2018-04-09 | Stop reason: HOSPADM

## 2018-04-09 NOTE — DISCHARGE INSTRUCTIONS
Learning About Relief for Back Pain  What is back tension and strain? Back strain happens when you overstretch, or pull, a muscle in your back. You may hurt your back in an accident or when you exercise or lift something. Most back pain will get better with rest and time. You can take care of yourself at home to help your back heal.  What can you do first to relieve back pain? When you first feel back pain, try these steps:  · Walk. Take a short walk (10 to 20 minutes) on a level surface (no slopes, hills, or stairs) every 2 to 3 hours. Walk only distances you can manage without pain, especially leg pain. · Relax. Find a comfortable position for rest. Some people are comfortable on the floor or a medium-firm bed with a small pillow under their head and another under their knees. Some people prefer to lie on their side with a pillow between their knees. Don't stay in one position for too long. · Try heat or ice. Try using a heating pad on a low or medium setting, or take a warm shower, for 15 to 20 minutes every 2 to 3 hours. Or you can buy single-use heat wraps that last up to 8 hours. You can also try an ice pack for 10 to 15 minutes every 2 to 3 hours. You can use an ice pack or a bag of frozen vegetables wrapped in a thin towel. There is not strong evidence that either heat or ice will help, but you can try them to see if they help. You may also want to try switching between heat and cold. · Take pain medicine exactly as directed. ¨ If the doctor gave you a prescription medicine for pain, take it as prescribed. ¨ If you are not taking a prescription pain medicine, ask your doctor if you can take an over-the-counter medicine. What else can you do? · Stretch and exercise. Exercises that increase flexibility may relieve your pain and make it easier for your muscles to keep your spine in a good, neutral position. And don't forget to keep walking. · Do self-massage.  You can use self-massage to unwind after work or school or to energize yourself in the morning. You can easily massage your feet, hands, or neck. Self-massage works best if you are in comfortable clothes and are sitting or lying in a comfortable position. Use oil or lotion to massage bare skin. · Reduce stress. Back pain can lead to a vicious [place]: Distress about the pain tenses the muscles in your back, which in turn causes more pain. Learn how to relax your mind and your muscles to lower your stress. Where can you learn more? Go to http://denisa-galo.info/. Enter B902 in the search box to learn more about \"Learning About Relief for Back Pain. \"  Current as of: March 21, 2017  Content Version: 11.4  © 0005-2233 Healthwise[employer]. Care instructions adapted under license by [employer] (which disclaims liability or warranty for this information). If you have questions about a medical condition or this instruction, always ask your healthcare professional. [place] [address] any warranty or liability for your use of this information.

## 2018-04-09 NOTE — ED NOTES
Kelsea with registration called and stated patient upset about having to return to the waiting room to wait for room placement. Per registration patient left main ED waiting area.

## 2018-04-09 NOTE — Clinical Note
Medication as prescribed. Keep your appointment with your orthopedic next Monday. Return to the Emergency Department for any new or worse symptoms.

## 2018-04-09 NOTE — ED PROVIDER NOTES
HPI Comments: Patient presents with mid back pain that has been ongoing. She states pain radiates down into her left leg and into her foot. She denies numbness and tingling. She denies problems with urination and with having bowel movements. She states she had a MRI and results were positive for bulging disc. She states she is suppose to follow up with orthopedics next week but is unable to tolerate pain. She denies new injury. Patient was ambulatory from the main ED to the [facility] without difficulty. Patient is a 36 y.o. female presenting with back pain. The history is provided by the patient. Back Pain    This is a chronic problem. The current episode started more than 1 week ago. The problem has been gradually worsening. The problem occurs constantly. Patient reports not work related injury. The pain is associated with no known injury. The pain is present in the thoracic spine. The quality of the pain is described as shooting. The pain radiates to the left thigh and left groin. The pain is at a severity of 7/10. The pain is mild. Pertinent negatives include no chest pain, no fever, no numbness, no weight loss, no headaches, no abdominal pain, no abdominal swelling, no bowel incontinence, no perianal numbness, no bladder incontinence, no dysuria, no pelvic pain, no leg pain, no paresthesias, no paresis, no tingling and no weakness. She has tried nothing for the symptoms.         Past Medical History:   Diagnosis Date    Carpal tunnel syndrome of left wrist 6/21/2017    Dental disorder     Fibromyalgia     Insomnia 6/8/2017    Long-term current use of methadone for opiate dependence (HCC)     Nausea & vomiting 10/31/2011    Psychiatric disorder     depression, ADHD and anxiety    Pyelonephritis, acute 10/31/2011    Pyelonephritis, acute 10/31/2011       Past Surgical History:   Procedure Laterality Date    HX CHOLECYSTECTOMY      HX GYN      uterine suspension    HX GYN  04/2017    total HYSTER    HX HEENT      tonsillectomy; TMJ    HX HEENT  2016    All top teeth removed due to gum disease    HX ORTHOPAEDIC      heel spurs ronen heels         Family History:   Problem Relation Age of Onset    Stroke Father      CEREBRAL HEMMORHAGE    Alcohol abuse Father     Arthritis-osteo Mother    Aviva He Arthritis-rheumatoid Mother     Hypertension Mother     Hypertension Maternal Grandfather     Heart Disease Maternal Grandfather     Arthritis-osteo Maternal Grandmother     Hypertension Maternal Grandmother     Other Maternal Grandmother      raynauds       Social History     Social History    Marital status: SINGLE     Spouse name: N/A    Number of children: N/A    Years of education: N/A     Occupational History    Not on file. Social History Main Topics    Smoking status: Current Every Day Smoker     Packs/day: 0.50     Years: 20.00    Smokeless tobacco: Never Used    Alcohol use No    Drug use: No      Comment: h/o opiate abuse    Sexual activity: Yes     Other Topics Concern    Not on file     Social History Narrative         ALLERGIES: Azithromycin; Clindamycin; Erythromycin; Keflex [cephalexin]; Macrobid [nitrofurantoin monohyd/m-cryst]; Penicillins; Doxycycline; Morphine; Other medication; Oxybutynin; Oxycodone; Tape [adhesive]; and Tramadol    Review of Systems   Constitutional: Negative for chills, fever and weight loss. Respiratory: Negative for cough and shortness of breath. Cardiovascular: Negative for chest pain. Gastrointestinal: Negative for abdominal pain, bowel incontinence, diarrhea, nausea and vomiting. Genitourinary: Negative for bladder incontinence, dysuria and pelvic pain. Musculoskeletal: Positive for back pain. Neurological: Negative for tingling, weakness, numbness, headaches and paresthesias.        Vitals:    04/09/18 0938   BP: 115/81   Pulse: 75   Resp: 16   Temp: 98.1 °F (36.7 °C)   SpO2: 100%   Weight: 69.9 kg (154 lb)   Height: 5' 4\" (1.626 m) Physical Exam   Constitutional: She is oriented to person, place, and time. No distress. Cardiovascular: Normal rate and regular rhythm. No murmur heard. Pulmonary/Chest: Effort normal and breath sounds normal.   Musculoskeletal:        Thoracic back: She exhibits tenderness and pain. Neurological: She is alert and oriented to person, place, and time. Skin: Skin is warm and dry. She is not diaphoretic. Psychiatric: She has a normal mood and affect. Her behavior is normal.   Nursing note and vitals reviewed. 10: 03 AM-After leaving the room patient noted to be on the phone with someone. She is noted to be complaining about medications she was given. I went into the room to discuss patient refusing medications ordered during her visit. She states medications will not help her pain. I explained that toradol and flexeril are very helpful with the pain that she is describing. She repeated multiple times that she recently had neck surgery and medications given will not help her pain. I explained that I will be prescribing her diclofenac and flexeril. I explained that if pain is not management with treatment provided her she needs to discuss with her pcp. Patient states Juan Collier do not prescribe pain medications\". Patient states she would like to return to the main ED for evaluation by a MD. I agreed and notified her that I will call the main ED and will make charge nurse and MD aware of her concerns. I spoke with Xi Perez, charge nurse, and Dr. Yoel Guardado. Patient refused prescriptions prior to leaving [facility]. .MDM  Number of Diagnoses or Management Options  Chronic midline thoracic back pain:   Diagnosis management comments: Patient refused toradol and flexeril. Patient returned to main ED for evaluation.         Amount and/or Complexity of Data Reviewed  Tests in the medicine section of CPT®: ordered    Patient Progress  Patient progress: stable        ED Course       Procedures

## 2018-04-09 NOTE — ED NOTES
Pt refused her medication states she takes these medications at home. Pt states they do not help her pain.

## 2018-04-09 NOTE — ED TRIAGE NOTES
Pt arrives via POV from home, pt states that she has a known herniated disc that she feels has \"blown out\". Pt states that she has a scheduled orthopedics appointment next Monday but is here for pain relief. Pt states the pain is radiating to L leg. Pt ambulates to triage with ease.

## 2018-07-03 PROBLEM — M54.41 CHRONIC RIGHT-SIDED LOW BACK PAIN WITH RIGHT-SIDED SCIATICA: Status: ACTIVE | Noted: 2018-07-03

## 2018-07-03 PROBLEM — G89.29 CHRONIC RIGHT-SIDED LOW BACK PAIN WITH RIGHT-SIDED SCIATICA: Status: ACTIVE | Noted: 2018-07-03
